# Patient Record
Sex: MALE | Race: BLACK OR AFRICAN AMERICAN | NOT HISPANIC OR LATINO | Employment: FULL TIME | URBAN - METROPOLITAN AREA
[De-identification: names, ages, dates, MRNs, and addresses within clinical notes are randomized per-mention and may not be internally consistent; named-entity substitution may affect disease eponyms.]

---

## 2018-12-06 ENCOUNTER — OCCUPATIONAL HEALTH (OUTPATIENT)
Dept: URGENT CARE | Facility: CLINIC | Age: 53
End: 2018-12-06

## 2018-12-06 PROCEDURE — 80305 DRUG TEST PRSMV DIR OPT OBS: CPT | Mod: S$GLB,,, | Performed by: NURSE PRACTITIONER

## 2019-02-02 ENCOUNTER — OFFICE VISIT (OUTPATIENT)
Dept: URGENT CARE | Facility: CLINIC | Age: 54
End: 2019-02-02

## 2019-02-02 VITALS
DIASTOLIC BLOOD PRESSURE: 82 MMHG | HEIGHT: 71 IN | HEART RATE: 81 BPM | OXYGEN SATURATION: 97 % | SYSTOLIC BLOOD PRESSURE: 138 MMHG | BODY MASS INDEX: 27.72 KG/M2 | RESPIRATION RATE: 18 BRPM | WEIGHT: 198 LBS | TEMPERATURE: 98 F

## 2019-02-02 DIAGNOSIS — B35.3 TINEA PEDIS OF RIGHT FOOT: Primary | ICD-10-CM

## 2019-02-02 PROCEDURE — 99203 OFFICE O/P NEW LOW 30 MIN: CPT | Mod: S$GLB,,, | Performed by: PHYSICIAN ASSISTANT

## 2019-02-02 PROCEDURE — 99203 PR OFFICE/OUTPT VISIT, NEW, LEVL III, 30-44 MIN: ICD-10-PCS | Mod: S$GLB,,, | Performed by: PHYSICIAN ASSISTANT

## 2019-02-02 RX ORDER — SULFAMETHOXAZOLE AND TRIMETHOPRIM 800; 160 MG/1; MG/1
1 TABLET ORAL 2 TIMES DAILY
Qty: 20 TABLET | Refills: 0 | Status: SHIPPED | OUTPATIENT
Start: 2019-02-02 | End: 2019-02-12

## 2019-02-02 RX ORDER — TERBINAFINE HYDROCHLORIDE 250 MG/1
250 TABLET ORAL DAILY
Qty: 14 TABLET | Refills: 0 | Status: SHIPPED | OUTPATIENT
Start: 2019-02-02 | End: 2019-02-16

## 2019-02-02 NOTE — LETTER
February 2, 2019      [facility]  [address], [address]  [facility] [address]  Phone: [phone]  Fax: [phone]       Patient: Trip Higgins   YOB: 1965  Date of Visit: 02/02/2019    To Whom It May Concern:    Collette Higgins  was at [facility] on 02/02/2019. He may return to work/school on 02/04/2019 with restrictions.  Please allow patient to wear breathable shoes for one week. If you have any questions or concerns, or if I can be of further assistance, please do not hesitate to contact me.    Sincerely,      Cristy Cain PA-C

## 2019-02-02 NOTE — PROGRESS NOTES
"Subjective:       Patient ID: Trip Higgins is a 54 y.o. male.    Vitals:  height is 5' 11" (1.803 m) and weight is 89.8 kg (198 lb). His oral temperature is 98.2 °F (36.8 °C). His blood pressure is 138/82 and his pulse is 81. His respiration is 18 and oxygen saturation is 97%.     Chief Complaint: Foot Pain (right)    Patient's work boots got wet a few weeks ago. He continued working like normal, thinking it was athlete's foot. Patient states he noticed swelling and a sore on his right foot and toes worsening today.      Foot Pain   This is a new problem. The current episode started 1 to 4 weeks ago (2 weeks). The problem occurs constantly. The problem has been gradually worsening. Associated symptoms include arthralgias, joint swelling (right foot) and a rash. Pertinent negatives include no chills, coughing, fever or sore throat. The symptoms are aggravated by walking, standing and stress. Treatments tried: foot cream. The treatment provided no relief.       Constitution: Negative for chills and fever.   HENT: Negative for facial swelling and sore throat.    Neck: Negative for painful lymph nodes.   Eyes: Negative for eye itching and eyelid swelling.   Respiratory: Negative for cough.    Musculoskeletal: Positive for pain, joint pain and joint swelling (right foot).   Skin: Positive for rash, wound and erythema. Negative for color change, pale, abrasion, laceration, lesion, skin thickening/induration, puncture wound, abscess, avulsion and hives.   Allergic/Immunologic: Negative for environmental allergies, immunocompromised state and hives.   Hematologic/Lymphatic: Negative for swollen lymph nodes.       Objective:      Physical Exam   Constitutional: He is oriented to person, place, and time. He appears well-developed and well-nourished.   HENT:   Head: Normocephalic and atraumatic. Head is without abrasion, without contusion and without laceration.   Right Ear: External ear normal.   Left Ear: External ear " normal.   Nose: Nose normal.   Mouth/Throat: Oropharynx is clear and moist.   Eyes: Conjunctivae, EOM and lids are normal. Pupils are equal, round, and reactive to light.   Neck: Trachea normal, full passive range of motion without pain and phonation normal. Neck supple.   Cardiovascular: Normal rate, regular rhythm and normal heart sounds.   Pulmonary/Chest: Effort normal and breath sounds normal. No stridor. No respiratory distress.   Musculoskeletal: Normal range of motion.        Feet:    Neurological: He is alert and oriented to person, place, and time.   Skin: Skin is warm, dry and intact. Capillary refill takes less than 2 seconds. No abrasion, no bruising, no burn, no ecchymosis, no laceration, no lesion and no rash noted. There is erythema.   Psychiatric: He has a normal mood and affect. His speech is normal and behavior is normal. Judgment and thought content normal. Cognition and memory are normal.   Nursing note and vitals reviewed.      Assessment:       1. Tinea pedis of right foot        Plan:         Tinea pedis of right foot  -     sulfamethoxazole-trimethoprim 800-160mg (BACTRIM DS) 800-160 mg Tab; Take 1 tablet by mouth 2 (two) times daily. for 10 days  Dispense: 20 tablet; Refill: 0  -     terbinafine HCl (LAMISIL) 250 mg tablet; Take 1 tablet (250 mg total) by mouth once daily. for 14 days  Dispense: 14 tablet; Refill: 0    Patient has a tinea pedis with a possible secondary infection.  Will cover with oral antifungals as well as abx.  Will recheck patient in 1-2 weeks.  Consider Derm referral if no improvement with the above treatment.  ER if worse.     Patient Instructions   1.  Take all medications as directed. If you have been prescribed antibiotics, make sure to complete them.   2.  Rest and keep yourself/patient well hydrated. For adults, it is recommended to drink at least 8-10 glasses of water daily.   3.  For patients above 6 months of age who are not allergic to and are not on  anticoagulants, you can alternate Tylenol and Motrin every 4-6 hours for fever above 100.4F and/or pain.  For patients less than 6 months of age, allergic to or intolerant to NSAIDS, have gastritis, gastric ulcers, or history of GI bleeds, are pregnant, or are on anticoagulant therapy, you can take Tylenol every 4 hours as needed for fever above 100.4F and/or pain.   4. You should schedule a follow-up appointment with your Primary Care Provider/Pediatrician for recheck in 2-3 days or as directed at this visit.   5.  If your condition fails to improve in a timely manner, you should receive another evaluation by your Primary Care Provider/Pediatrician to discuss your concerns or return to urgent care for a recheck.  If your condition worsens at any time, you should report immediately to your nearest Emergency Department for further evaluation. **You must understand that you have received Urgent Care treatment only and that you may be released before all of your medical problems are known or treated. You, the patient, are responsible to arrange for follow-up care as instructed.         Athletes Foot    Athletes foot (tinea pedis) is caused by a fungal infection in the skin. It affects the skin between the toes, causing cracks in the skin called fissures. It can also affect the bottom of the foot where it causes dry white scales and peeling of the skin. This infection is more likely to occur when the foot is in hot, sweaty socks and shoes for long periods of time. You may feel itching and burning between your toes. This infection is treated with skin creams or medicine taken by mouth.  Home care  The following are general care guidelines:  · It is important to keep the feet dry. Use absorbent cotton socks and change them if they become sweaty. Or wear an open-toe shoe or sandal. Wash the feet at least once a day with soap and water.  · Apply the antifungal cream as prescribed. Some antifungal creams are available  without a prescription.  · It may take a week before the rash starts to improve. It can take about 3 to 4 weeks to completely clear. Continue the medicine until the rash is all gone.  · Use over-the-counter antifungal powders or sprays on your feet after exposure to high-risk environments, such as public showers, gyms, and locker rooms. This can help prevent future infections. Wearing appropriate shoes in these situations can help.  Prevention  The following tips may help prevent athletes foot:  · Don't share shoes or socks with someone who has athlete's foot.  · Don't walk barefoot in places where a fungal infection can spread quickly such as locker rooms, showers, and swimming pools.  · Change socks regularly.  · Alternate shoes to assist in drying.  Follow-up care  Follow up with your healthcare provider as recommended if the rash does not improve after 10 days of treatment, or if the rash continues to spread.  When to seek medical care  Get medical attention right away if any of the following occur:  · Fever of 100.4°F (38°C) or higher, or as directed  · Increasing redness or swelling of the foot  · Infection comes back soon after treatment  · Pus draining from cracks in the skin  Date Last Reviewed: 8/1/2016  © 8751-3273 [employer]. [address] [place] [address], [place] [address]. All rights reserved. This information is not intended as a substitute for professional medical care. Always follow your healthcare professional's instructions.

## 2019-02-02 NOTE — PATIENT INSTRUCTIONS
1.  Take all medications as directed. If you have been prescribed antibiotics, make sure to complete them.   2.  Rest and keep yourself/patient well hydrated. For adults, it is recommended to drink at least 8-10 glasses of water daily.   3.  For patients above 6 months of age who are not allergic to and are not on anticoagulants, you can alternate Tylenol and Motrin every 4-6 hours for fever above 100.4F and/or pain.  For patients less than 6 months of age, allergic to or intolerant to NSAIDS, have gastritis, gastric ulcers, or history of GI bleeds, are pregnant, or are on anticoagulant therapy, you can take Tylenol every 4 hours as needed for fever above 100.4F and/or pain.   4. You should schedule a follow-up appointment with your Primary Care Provider/Pediatrician for recheck in 2-3 days or as directed at this visit.   5.  If your condition fails to improve in a timely manner, you should receive another evaluation by your Primary Care Provider/Pediatrician to discuss your concerns or return to urgent care for a recheck.  If your condition worsens at any time, you should report immediately to your nearest Emergency Department for further evaluation. **You must understand that you have received Urgent Care treatment only and that you may be released before all of your medical problems are known or treated. You, the patient, are responsible to arrange for follow-up care as instructed.         Athletes Foot    Athletes foot (tinea pedis) is caused by a fungal infection in the skin. It affects the skin between the toes, causing cracks in the skin called fissures. It can also affect the bottom of the foot where it causes dry white scales and peeling of the skin. This infection is more likely to occur when the foot is in hot, sweaty socks and shoes for long periods of time. You may feel itching and burning between your toes. This infection is treated with skin creams or medicine taken by mouth.  Home care  The  following are general care guidelines:  · It is important to keep the feet dry. Use absorbent cotton socks and change them if they become sweaty. Or wear an open-toe shoe or sandal. Wash the feet at least once a day with soap and water.  · Apply the antifungal cream as prescribed. Some antifungal creams are available without a prescription.  · It may take a week before the rash starts to improve. It can take about 3 to 4 weeks to completely clear. Continue the medicine until the rash is all gone.  · Use over-the-counter antifungal powders or sprays on your feet after exposure to high-risk environments, such as public showers, gyms, and locker rooms. This can help prevent future infections. Wearing appropriate shoes in these situations can help.  Prevention  The following tips may help prevent athletes foot:  · Don't share shoes or socks with someone who has athlete's foot.  · Don't walk barefoot in places where a fungal infection can spread quickly such as locker rooms, showers, and swimming pools.  · Change socks regularly.  · Alternate shoes to assist in drying.  Follow-up care  Follow up with your healthcare provider as recommended if the rash does not improve after 10 days of treatment, or if the rash continues to spread.  When to seek medical care  Get medical attention right away if any of the following occur:  · Fever of 100.4°F (38°C) or higher, or as directed  · Increasing redness or swelling of the foot  · Infection comes back soon after treatment  · Pus draining from cracks in the skin  Date Last Reviewed: 8/1/2016  © 5780-3479 [employer]. [address] [place] [address], [place] [address]. All rights reserved. This information is not intended as a substitute for professional medical care. Always follow your healthcare professional's instructions.

## 2019-03-29 ENCOUNTER — OFFICE VISIT (OUTPATIENT)
Dept: URGENT CARE | Facility: CLINIC | Age: 54
End: 2019-03-29

## 2019-03-29 VITALS
WEIGHT: 198 LBS | SYSTOLIC BLOOD PRESSURE: 156 MMHG | OXYGEN SATURATION: 100 % | HEART RATE: 79 BPM | HEIGHT: 71 IN | TEMPERATURE: 98 F | BODY MASS INDEX: 27.72 KG/M2 | DIASTOLIC BLOOD PRESSURE: 86 MMHG

## 2019-03-29 DIAGNOSIS — F17.200 SMOKER: ICD-10-CM

## 2019-03-29 DIAGNOSIS — B35.3 TINEA PEDIS OF LEFT FOOT: Primary | ICD-10-CM

## 2019-03-29 PROCEDURE — 99214 PR OFFICE/OUTPT VISIT, EST, LEVL IV, 30-39 MIN: ICD-10-PCS | Mod: S$GLB,,, | Performed by: FAMILY MEDICINE

## 2019-03-29 PROCEDURE — 99214 OFFICE O/P EST MOD 30 MIN: CPT | Mod: S$GLB,,, | Performed by: FAMILY MEDICINE

## 2019-03-29 RX ORDER — MUPIROCIN 20 MG/G
OINTMENT TOPICAL 2 TIMES DAILY
Qty: 30 G | Refills: 0 | OUTPATIENT
Start: 2019-03-29 | End: 2022-11-06

## 2019-03-29 RX ORDER — TERBINAFINE HYDROCHLORIDE 250 MG/1
250 TABLET ORAL DAILY
Qty: 21 TABLET | Refills: 0 | Status: SHIPPED | OUTPATIENT
Start: 2019-03-29 | End: 2019-04-19

## 2019-03-29 RX ORDER — SULFAMETHOXAZOLE AND TRIMETHOPRIM 800; 160 MG/1; MG/1
1 TABLET ORAL 2 TIMES DAILY
Qty: 20 TABLET | Refills: 0 | OUTPATIENT
Start: 2019-03-29 | End: 2022-11-06

## 2019-03-29 NOTE — PROGRESS NOTES
"Subjective:       Patient ID: Trip Higgins is a 54 y.o. male.    Vitals:  height is 5' 11" (1.803 m) and weight is 89.8 kg (198 lb). His oral temperature is 97.8 °F (36.6 °C). His blood pressure is 156/86 (abnormal) and his pulse is 79. His oxygen saturation is 100%.     Chief Complaint: Foot Problem    Rash   This is a chronic (1 month) problem. The problem has been waxing and waning since onset. The affected locations include the left foot and right foot. The rash is characterized by draining, pain and redness. It is unknown if there was an exposure to a precipitant. Pertinent negatives include no congestion, cough, diarrhea, eye pain, fever, joint pain, sore throat or vomiting. Past treatments include antibiotics (lamisal). The treatment provided mild relief.       Constitution: Negative for chills and fever.   HENT: Negative for facial swelling, congestion and sore throat.    Neck: Negative for painful lymph nodes.   Eyes: Negative for eye itching, eye pain and eyelid swelling.   Respiratory: Negative for cough.    Gastrointestinal: Negative for vomiting and diarrhea.   Musculoskeletal: Negative for joint pain and joint swelling.   Skin: Positive for rash, wound and erythema. Negative for color change, pale, abrasion, laceration, lesion, skin thickening/induration, puncture wound, abscess, avulsion and hives.   Allergic/Immunologic: Positive for itching. Negative for environmental allergies, immunocompromised state and hives.   Hematologic/Lymphatic: Negative for swollen lymph nodes.       Objective:      Physical Exam   Constitutional: He is oriented to person, place, and time. He appears well-developed and well-nourished.   HENT:   Head: Normocephalic and atraumatic. Head is without abrasion, without contusion and without laceration.   Right Ear: External ear normal.   Left Ear: External ear normal.   Nose: Nose normal.   Mouth/Throat: Oropharynx is clear and moist.   Eyes: Pupils are equal, round, and " reactive to light. Conjunctivae, EOM and lids are normal.   Neck: Trachea normal, full passive range of motion without pain and phonation normal. Neck supple.   Cardiovascular: Normal rate, regular rhythm and normal heart sounds.   Pulmonary/Chest: Effort normal and breath sounds normal. No stridor. No respiratory distress.   Musculoskeletal: Normal range of motion.   Neurological: He is alert and oriented to person, place, and time.   Skin: Skin is warm, dry and intact. Capillary refill takes less than 2 seconds. No abrasion, no bruising, no burn, no ecchymosis, no laceration, no lesion and no rash noted. There is erythema.   Psychiatric: He has a normal mood and affect. His speech is normal and behavior is normal. Judgment and thought content normal. Cognition and memory are normal.   Nursing note and vitals reviewed.      Assessment:       1. Tinea pedis of left foot    2. Smoker        Plan:         Tinea pedis of left foot  -     sulfamethoxazole-trimethoprim 800-160mg (BACTRIM DS) 800-160 mg Tab; Take 1 tablet by mouth 2 (two) times daily.  Dispense: 20 tablet; Refill: 0  -     mupirocin (BACTROBAN) 2 % ointment; Apply topically 2 (two) times daily.  Dispense: 30 g; Refill: 0  -     Ambulatory referral to Podiatry  -     terbinafine HCl (LAMISIL) 250 mg tablet; Take 1 tablet (250 mg total) by mouth once daily. for 21 days  Dispense: 21 tablet; Refill: 0    Smoker  -     Ambulatory referral to Smoking Cessation Program    Please drink plenty of fluids.  Please get plenty of rest.  Please return here or go to the Emergency Department for any concerns or worsening of condition.  If you were prescribed antibiotics, please take them to completion.  If you were prescribed a narcotic medication, do not drive or operate heavy equipment or machinery while taking these medications.  Please return here in 1-3 days for a recheck of your wound.  If not allergic, please take over the counter Tylenol (Acetaminophen) and/or  Motrin (Ibuprofen) as directed for control of pain and/or fever.  Soak in a warm tub of water (as warm as you can stand without hurting yourself) or put a warm wet compress on the area as often as you resonably can (more is better.)    If you  smoke, please stop smoking.    Please follow up with your primary care doctor or specialist as needed.      Primary Doctor No  None

## 2019-03-29 NOTE — LETTER
March 29, 2019  Trip Higgins  [address] [address]                [facility]  [address]  [facility] [address]  Phone: [phone]  Fax: [phone] Trip Higgins was seen and treated in our Urgent Care department   on 3/29/2019. He may return to work in 2 - 3 days.      If you have any questions or concerns, please don't hesitate to call.    Sincerely,        Osmin Quinteros MD

## 2019-03-29 NOTE — PATIENT INSTRUCTIONS
Please drink plenty of fluids.  Please get plenty of rest.  Please return here or go to the Emergency Department for any concerns or worsening of condition.  If you were prescribed antibiotics, please take them to completion.  If you were prescribed a narcotic medication, do not drive or operate heavy equipment or machinery while taking these medications.  Please return here in 1-3 days for a recheck of your wound.  If not allergic, please take over the counter Tylenol (Acetaminophen) and/or Motrin (Ibuprofen) as directed for control of pain and/or fever.  Soak in a warm tub of water (as warm as you can stand without hurting yourself) or put a warm wet compress on the area as often as you resonably can (more is better.)    If you  smoke, please stop smoking.    Please follow up with your primary care doctor or specialist as needed.      Primary Doctor No  None    Podiatry - [facility]  [address], [place]  [address]    [phone]          Athletes Foot    Athletes foot (tinea pedis) is caused by a fungal infection in the skin. It affects the skin between the toes, causing cracks in the skin called fissures. It can also affect the bottom of the foot where it causes dry white scales and peeling of the skin. This infection is more likely to occur when the foot is in hot, sweaty socks and shoes for long periods of time. You may feel itching and burning between your toes. This infection is treated with skin creams or medicine taken by mouth.  Home care  The following are general care guidelines:  · It is important to keep the feet dry. Use absorbent cotton socks and change them if they become sweaty. Or wear an open-toe shoe or sandal. Wash the feet at least once a day with soap and water.  · Apply the antifungal cream as prescribed. Some antifungal creams are available without a prescription.  · It may take a week before the rash starts to improve. It can take about 3 to 4 weeks to  completely clear. Continue the medicine until the rash is all gone.  · Use over-the-counter antifungal powders or sprays on your feet after exposure to high-risk environments, such as public showers, gyms, and locker rooms. This can help prevent future infections. Wearing appropriate shoes in these situations can help.  Prevention  The following tips may help prevent athletes foot:  · Don't share shoes or socks with someone who has athlete's foot.  · Don't walk barefoot in places where a fungal infection can spread quickly such as locker rooms, showers, and swimming pools.  · Change socks regularly.  · Alternate shoes to assist in drying.  Follow-up care  Follow up with your healthcare provider as recommended if the rash does not improve after 10 days of treatment, or if the rash continues to spread.  When to seek medical care  Get medical attention right away if any of the following occur:  · Fever of 100.4°F (38°C) or higher, or as directed  · Increasing redness or swelling of the foot  · Infection comes back soon after treatment  · Pus draining from cracks in the skin  Date Last Reviewed: 8/1/2016  © 3709-1143 The [employer]. [address] [place] [address], [place] [address]. All rights reserved. This information is not intended as a substitute for professional medical care. Always follow your healthcare professional's instructions.